# Patient Record
Sex: FEMALE
[De-identification: names, ages, dates, MRNs, and addresses within clinical notes are randomized per-mention and may not be internally consistent; named-entity substitution may affect disease eponyms.]

---

## 2019-06-13 ENCOUNTER — APPOINTMENT (OUTPATIENT)
Dept: ORTHOPEDIC SURGERY | Facility: CLINIC | Age: 44
End: 2019-06-13
Payer: COMMERCIAL

## 2019-06-13 VITALS — HEIGHT: 65 IN | BODY MASS INDEX: 34.99 KG/M2 | WEIGHT: 210 LBS

## 2019-06-13 DIAGNOSIS — M25.571 PAIN IN RIGHT ANKLE AND JOINTS OF RIGHT FOOT: ICD-10-CM

## 2019-06-13 DIAGNOSIS — M25.572 PAIN IN LEFT ANKLE AND JOINTS OF LEFT FOOT: ICD-10-CM

## 2019-06-13 PROBLEM — Z00.00 ENCOUNTER FOR PREVENTIVE HEALTH EXAMINATION: Status: ACTIVE | Noted: 2019-06-13

## 2019-06-13 PROCEDURE — 73630 X-RAY EXAM OF FOOT: CPT | Mod: 50

## 2019-06-13 PROCEDURE — 99204 OFFICE O/P NEW MOD 45 MIN: CPT

## 2019-06-13 NOTE — HISTORY OF PRESENT ILLNESS
[FreeTextEntry1] : Location: tatyana feet \par Quality: dull \par Duration:1.5 months\par Context: atraumatic \par Aggravating Factors:walking, exercise\par Conservative treatment: rest,acupuncture , HEP, PT (last session 1 year ago) \par Associated Symptoms:n/a \par Prior Studies:n/a\par

## 2019-06-13 NOTE — PHYSICAL EXAM
[de-identified] : Bilateral foot x-ray shows evidence of heel spurs. [de-identified] : Bilateral foot examination shows no evidence of warmth or swelling. There is tenderness along the plantar fascia. Some tenderness at the Achilles and distal insertion site. There is some tenderness along the calf bilaterally. Neurovascular exam is normal. She does have flat pronated feet. Normal range of motion

## 2019-06-13 NOTE — DISCUSSION/SUMMARY
[de-identified] : This patient will do a course of physical therapy. She was given a handout on plantar fasciitis. She will try to wear shoe wear that gives her good arch support. Follow up will be as needed.

## 2019-06-13 NOTE — REVIEW OF SYSTEMS
[Arthralgia] : arthralgia [Joint Pain] : no joint pain [Joint Swelling] : no joint swelling [Joint Stiffness] : no joint stiffness [Negative] : Heme/Lymph